# Patient Record
Sex: FEMALE | Race: WHITE | NOT HISPANIC OR LATINO | Employment: UNEMPLOYED | URBAN - METROPOLITAN AREA
[De-identification: names, ages, dates, MRNs, and addresses within clinical notes are randomized per-mention and may not be internally consistent; named-entity substitution may affect disease eponyms.]

---

## 2020-05-14 ENCOUNTER — HOSPITAL ENCOUNTER (EMERGENCY)
Facility: HOSPITAL | Age: 50
Discharge: HOME | End: 2020-05-14
Attending: EMERGENCY MEDICINE
Payer: MEDICAID

## 2020-05-14 VITALS
BODY MASS INDEX: 31.01 KG/M2 | HEIGHT: 63 IN | DIASTOLIC BLOOD PRESSURE: 84 MMHG | OXYGEN SATURATION: 97 % | WEIGHT: 175 LBS | HEART RATE: 89 BPM | RESPIRATION RATE: 18 BRPM | SYSTOLIC BLOOD PRESSURE: 134 MMHG | TEMPERATURE: 99 F

## 2020-05-14 DIAGNOSIS — L03.211 FACIAL CELLULITIS: Primary | ICD-10-CM

## 2020-05-14 LAB
ALBUMIN SERPL BCP-MCNC: 3.9 G/DL (ref 3.5–5.2)
ALP SERPL-CCNC: 86 U/L (ref 55–135)
ALT SERPL W/O P-5'-P-CCNC: 12 U/L (ref 10–44)
ANION GAP SERPL CALC-SCNC: 12 MMOL/L (ref 8–16)
AST SERPL-CCNC: 19 U/L (ref 10–40)
BASOPHILS # BLD AUTO: 0.06 K/UL (ref 0–0.2)
BASOPHILS NFR BLD: 0.5 % (ref 0–1.9)
BILIRUB SERPL-MCNC: 0.3 MG/DL (ref 0.1–1)
BUN SERPL-MCNC: 11 MG/DL (ref 6–20)
CALCIUM SERPL-MCNC: 9.5 MG/DL (ref 8.7–10.5)
CHLORIDE SERPL-SCNC: 104 MMOL/L (ref 95–110)
CO2 SERPL-SCNC: 22 MMOL/L (ref 23–29)
CREAT SERPL-MCNC: 0.9 MG/DL (ref 0.5–1.4)
DIFFERENTIAL METHOD: NORMAL
EOSINOPHIL # BLD AUTO: 0.2 K/UL (ref 0–0.5)
EOSINOPHIL NFR BLD: 1.4 % (ref 0–8)
ERYTHROCYTE [DISTWIDTH] IN BLOOD BY AUTOMATED COUNT: 12.3 % (ref 11.5–14.5)
EST. GFR  (AFRICAN AMERICAN): >60 ML/MIN/1.73 M^2
EST. GFR  (NON AFRICAN AMERICAN): >60 ML/MIN/1.73 M^2
GLUCOSE SERPL-MCNC: 120 MG/DL (ref 70–110)
HCT VFR BLD AUTO: 44.1 % (ref 37–48.5)
HGB BLD-MCNC: 14.2 G/DL (ref 12–16)
IMM GRANULOCYTES # BLD AUTO: 0.04 K/UL (ref 0–0.04)
IMM GRANULOCYTES NFR BLD AUTO: 0.4 % (ref 0–0.5)
LYMPHOCYTES # BLD AUTO: 2.7 K/UL (ref 1–4.8)
LYMPHOCYTES NFR BLD: 24.1 % (ref 18–48)
MCH RBC QN AUTO: 28.7 PG (ref 27–31)
MCHC RBC AUTO-ENTMCNC: 32.2 G/DL (ref 32–36)
MCV RBC AUTO: 89 FL (ref 82–98)
MONOCYTES # BLD AUTO: 0.8 K/UL (ref 0.3–1)
MONOCYTES NFR BLD: 6.8 % (ref 4–15)
NEUTROPHILS # BLD AUTO: 7.5 K/UL (ref 1.8–7.7)
NEUTROPHILS NFR BLD: 66.8 % (ref 38–73)
NRBC BLD-RTO: 0 /100 WBC
PLATELET # BLD AUTO: 297 K/UL (ref 150–350)
PMV BLD AUTO: 10.5 FL (ref 9.2–12.9)
POTASSIUM SERPL-SCNC: 3.8 MMOL/L (ref 3.5–5.1)
PROT SERPL-MCNC: 7.4 G/DL (ref 6–8.4)
RBC # BLD AUTO: 4.94 M/UL (ref 4–5.4)
SODIUM SERPL-SCNC: 138 MMOL/L (ref 136–145)
WBC # BLD AUTO: 11.17 K/UL (ref 3.9–12.7)

## 2020-05-14 PROCEDURE — 85025 COMPLETE CBC W/AUTO DIFF WBC: CPT

## 2020-05-14 PROCEDURE — 25000003 PHARM REV CODE 250: Performed by: EMERGENCY MEDICINE

## 2020-05-14 PROCEDURE — 99284 EMERGENCY DEPT VISIT MOD MDM: CPT | Mod: ,,, | Performed by: EMERGENCY MEDICINE

## 2020-05-14 PROCEDURE — 80053 COMPREHEN METABOLIC PANEL: CPT

## 2020-05-14 PROCEDURE — 25500020 PHARM REV CODE 255: Performed by: EMERGENCY MEDICINE

## 2020-05-14 PROCEDURE — 99284 PR EMERGENCY DEPT VISIT,LEVEL IV: ICD-10-PCS | Mod: ,,, | Performed by: EMERGENCY MEDICINE

## 2020-05-14 PROCEDURE — 99285 EMERGENCY DEPT VISIT HI MDM: CPT | Mod: 25

## 2020-05-14 RX ORDER — AMOXICILLIN AND CLAVULANATE POTASSIUM 875; 125 MG/1; MG/1
1 TABLET, FILM COATED ORAL 2 TIMES DAILY
Qty: 14 TABLET | Refills: 0 | Status: SHIPPED | OUTPATIENT
Start: 2020-05-14

## 2020-05-14 RX ORDER — AMOXICILLIN AND CLAVULANATE POTASSIUM 875; 125 MG/1; MG/1
1 TABLET, FILM COATED ORAL
Status: COMPLETED | OUTPATIENT
Start: 2020-05-14 | End: 2020-05-14

## 2020-05-14 RX ADMIN — AMOXICILLIN AND CLAVULANATE POTASSIUM 1 TABLET: 875; 125 TABLET, FILM COATED ORAL at 04:05

## 2020-05-14 RX ADMIN — IOHEXOL 75 ML: 350 INJECTION, SOLUTION INTRAVENOUS at 03:05

## 2020-05-14 NOTE — ED PROVIDER NOTES
Source of History:  Patient    Chief complaint:  Facial Swelling (Pt c/o left sided facial swelling and pain since last night. Pt reports taking tylenol and naproxen with no relief. Swelling is firm to touch. -tongue swelling, throat swelling, trouble breathing. )      HPI:  Estela Barbour is a 50 y.o. female presenting with left-sided facial swelling for 24 hr.  She notes that she has had left-sided facial pain for several days, woke up 1 day ago noticed swelling over her left jaw.  It is very tender palpation feels firm.  She feels like she had a similar episode was diagnosed with cellulitis several years ago on the other side.  She denies any fevers or chills.  Denies any bleeding or discharge.  No drainage.  She states the pain is severe, throbbing, nonradiating.  No problems with eating or swallowing, however there is some pain with mastication.    ROS: As per HPI and below:    General: No fever.  No chills.  Eyes: No visual changes.  Head: No headache.    Integument: No rashes or lesions.  Chest: No shortness of breath.  Cardiovascular: No chest pain.  Abdomen: No abdominal pain.  No nausea or vomiting.  Urinary: No abnormal urination.  Neurologic: No focal weakness.  No numbness.  Hematologic: No easy bruising.  Endocrine: No excessive thirst or urination.      Review of patient's allergies indicates:  No Known Allergies    No current facility-administered medications on file prior to encounter.      No current outpatient medications on file prior to encounter.       PMH:  As per HPI and below:  History reviewed. No pertinent past medical history.  Past Surgical History:   Procedure Laterality Date    TONSILLECTOMY      TUBAL LIGATION         Social History     Socioeconomic History    Marital status: Single     Spouse name: Not on file    Number of children: Not on file    Years of education: Not on file    Highest education level: Not on file   Occupational History    Not on file   Social Needs     Financial resource strain: Not on file    Food insecurity:     Worry: Not on file     Inability: Not on file    Transportation needs:     Medical: Not on file     Non-medical: Not on file   Tobacco Use    Smoking status: Current Every Day Smoker   Substance and Sexual Activity    Alcohol use: Never     Frequency: Never    Drug use: Never    Sexual activity: Not on file   Lifestyle    Physical activity:     Days per week: Not on file     Minutes per session: Not on file    Stress: Not on file   Relationships    Social connections:     Talks on phone: Not on file     Gets together: Not on file     Attends Yazdanism service: Not on file     Active member of club or organization: Not on file     Attends meetings of clubs or organizations: Not on file     Relationship status: Not on file   Other Topics Concern    Not on file   Social History Narrative    Not on file       History reviewed. No pertinent family history.    Physical Exam:    Vitals:    05/14/20 0150   BP: (!) 176/82   Pulse: 108   Resp: 16   Temp: 100.1 °F (37.8 °C)     Appearance: No acute distress.  Skin: No rashes seen.  Good turgor.  No abrasions.  No ecchymoses.  Eyes: No conjunctival injection. EOMI, PERRL.  ENT:  Poor dentition, especially over left mandibular teeth.  One anterior left molar appears to be decaying with severe swelling near its base.  This corresponds to swelling on the cheek externally.  It is very tender but not fluctuant.  Tenderness/erythema extends up the cheek and down onto the neck slightly.    Chest:  No increased work of breathing, bilateral chest rise.  Cardiovascular: Regular rate and rhythm.    Abdomen: Soft.  Not distended.  Nontender.  No guarding.  No rebound. No Masses  Musculoskeletal: Good range of motion all joints.  No deformities.  Neck supple.  No meningismus.  Neurologic: Moves all extremities.  Normal sensation.  No facial droop.  Normal speech.    Mental Status:  Alert and oriented x 3.   Appropriate, conversant.          Laboratory Studies:  Labs Reviewed - No data to display      Imaging Results           CT Maxillofacial With Contrast (Final result)  Result time 05/14/20 04:21:34    Final result by Eduard Kraus MD (05/14/20 04:21:34)                 Impression:      Multiple bilateral dental caries and periodontal disease with small sinus tract extending from the left 1st mandibular molar tooth to the soft tissues lateral to the left mandible.  Associated inflammatory changes in the left lower lateral mandibular and submandibular soft tissues suggestive of odontogenic cellulitis.  No drainable fluid collection.  Close dental follow-up is recommended.    Mild asymmetric enlargement of left cervical chain lymph nodes, likely reactive.    This report was flagged in Epic as abnormal.      Electronically signed by: Eduard Kraus MD  Date:    05/14/2020  Time:    04:21             Narrative:    EXAMINATION:  CT MAXILLOFACIAL WITH CONTRAST    CLINICAL HISTORY:  Mass, lump or swelling, maxface;    TECHNIQUE:  Helical CT images of the maxillofacial region were obtained after the administration of 75 cc Omnipaque 350 IV contrast.  Axial, coronal, and sagittal reconstructions were created from the source data in bone and soft tissue window algorithm.    COMPARISON:  None.    FINDINGS:  No evidence of acute facial fracture.  There is mild mucosal thickening in the inferior aspect of the left maxillary sinus.  Paranasal sinuses are otherwise essentially clear.  No air-fluid levels.  Asymmetric decreased pneumatization of the left mastoid air cells.  No mastoid effusion.    Multiple dental caries involving multiple bilateral mandibular and maxillary teeth, though most pronounced involving the left mandibular molar and premolar teeth.  Subtle periapical lucencies involving the bilateral mandibular molar teeth with possible sinus tract extending from the left 1st mandibular molar teeth to the soft tissues  lateral to the left mandible.  Asymmetric enlargement of the muscles at the anterolateral aspect of the left mandible with subcutaneous edema and inflammatory change in the left mandibular fat and submandibular fossa.  No drainable abscess is identified.    Asymmetric enlargement of left cervical chain lymph nodes measuring up to 10 mm in short axis, likely reactive.    Parotid and submandibular glands are unremarkable.  The epiglottis is normal.  Upper airway is patent and there is no significant mass effect identified.  Vasculature in the upper neck is patent.    Globes are symmetric.  Retrobulbar fat is preserved.    Limited intracranial evaluation is unremarkable.  Upper cervical spine demonstrates mild degenerative changes and is otherwise unremarkable.                                Medications Given:  Medications - No data to display    Discussed with: pt    MDM:    50 y.o. female with left-sided mandibular swelling concerning for odontogenic neck infection versus abscess.  CT scan shows no fluid collection, however the swelling and induration is concerning for infection.  Will start on Augmentin.  Vital stable.  Will give list of new by dentist for patient as well.  Labs reassuring.  No leukocytosis, normal renal function.  Stable for discharge.  Advised pt to follow up with PCP or return if concerning symptoms arise. Pt understands and agrees with plan. Will d/c home.          Diagnostic Impression:    1. Facial cellulitis             Be Mauro MD  05/14/20 0444

## 2020-05-14 NOTE — ED TRIAGE NOTES
Pt reports left facial swelling that started last night and gotten worse. Reports that she hasnt tried anything new. Painful to touch. Has a cracked tooth on the left side but does not know if its relatable.